# Patient Record
Sex: MALE | Race: BLACK OR AFRICAN AMERICAN | NOT HISPANIC OR LATINO | URBAN - METROPOLITAN AREA
[De-identification: names, ages, dates, MRNs, and addresses within clinical notes are randomized per-mention and may not be internally consistent; named-entity substitution may affect disease eponyms.]

---

## 2017-01-18 ENCOUNTER — TELEPHONE (OUTPATIENT)
Dept: HEPATOLOGY | Facility: CLINIC | Age: 57
End: 2017-01-18

## 2017-01-18 NOTE — TELEPHONE ENCOUNTER
Rickey T Barthelemy Sr. was a no show for their 01/18/2017 appointment. Please attempt to reschedule visit. Let me know if unable to reschedule visit.     Thanks,   Jossue

## 2017-01-19 NOTE — TELEPHONE ENCOUNTER
Attempted to reschedule consult. Both numbers listed are out of service. Letter will be mailed to pt.